# Patient Record
Sex: FEMALE | Race: OTHER | URBAN - METROPOLITAN AREA
[De-identification: names, ages, dates, MRNs, and addresses within clinical notes are randomized per-mention and may not be internally consistent; named-entity substitution may affect disease eponyms.]

---

## 2024-03-20 ENCOUNTER — EMERGENCY (EMERGENCY)
Facility: HOSPITAL | Age: 54
LOS: 0 days | Discharge: HOME | End: 2024-03-20
Attending: STUDENT IN AN ORGANIZED HEALTH CARE EDUCATION/TRAINING PROGRAM
Payer: COMMERCIAL

## 2024-03-20 VITALS
HEART RATE: 70 BPM | SYSTOLIC BLOOD PRESSURE: 100 MMHG | RESPIRATION RATE: 17 BRPM | TEMPERATURE: 98 F | DIASTOLIC BLOOD PRESSURE: 65 MMHG | OXYGEN SATURATION: 99 %

## 2024-03-20 VITALS
SYSTOLIC BLOOD PRESSURE: 96 MMHG | HEIGHT: 62 IN | OXYGEN SATURATION: 96 % | HEART RATE: 81 BPM | WEIGHT: 145.95 LBS | RESPIRATION RATE: 18 BRPM | DIASTOLIC BLOOD PRESSURE: 66 MMHG | TEMPERATURE: 99 F

## 2024-03-20 DIAGNOSIS — R11.0 NAUSEA: ICD-10-CM

## 2024-03-20 DIAGNOSIS — Z88.4 ALLERGY STATUS TO ANESTHETIC AGENT: ICD-10-CM

## 2024-03-20 DIAGNOSIS — F41.9 ANXIETY DISORDER, UNSPECIFIED: ICD-10-CM

## 2024-03-20 DIAGNOSIS — R07.89 OTHER CHEST PAIN: ICD-10-CM

## 2024-03-20 DIAGNOSIS — R10.13 EPIGASTRIC PAIN: ICD-10-CM

## 2024-03-20 DIAGNOSIS — Z91.010 ALLERGY TO PEANUTS: ICD-10-CM

## 2024-03-20 DIAGNOSIS — R14.2 ERUCTATION: ICD-10-CM

## 2024-03-20 DIAGNOSIS — E78.5 HYPERLIPIDEMIA, UNSPECIFIED: ICD-10-CM

## 2024-03-20 LAB
ALBUMIN SERPL ELPH-MCNC: 3.1 G/DL — LOW (ref 3.3–5)
ALP SERPL-CCNC: 95 U/L — SIGNIFICANT CHANGE UP (ref 40–120)
ALT FLD-CCNC: 36 U/L — SIGNIFICANT CHANGE UP (ref 12–78)
ANION GAP SERPL CALC-SCNC: 6 MMOL/L — SIGNIFICANT CHANGE UP (ref 5–17)
AST SERPL-CCNC: 31 U/L — SIGNIFICANT CHANGE UP (ref 15–37)
BASOPHILS # BLD AUTO: 0.01 K/UL — SIGNIFICANT CHANGE UP (ref 0–0.2)
BASOPHILS NFR BLD AUTO: 0.2 % — SIGNIFICANT CHANGE UP (ref 0–2)
BILIRUB SERPL-MCNC: 0.6 MG/DL — SIGNIFICANT CHANGE UP (ref 0.2–1.2)
BUN SERPL-MCNC: 12 MG/DL — SIGNIFICANT CHANGE UP (ref 7–23)
CALCIUM SERPL-MCNC: 8.3 MG/DL — LOW (ref 8.5–10.1)
CHLORIDE SERPL-SCNC: 112 MMOL/L — HIGH (ref 96–108)
CO2 SERPL-SCNC: 23 MMOL/L — SIGNIFICANT CHANGE UP (ref 22–31)
CREAT SERPL-MCNC: 0.69 MG/DL — SIGNIFICANT CHANGE UP (ref 0.5–1.3)
EGFR: 103 ML/MIN/1.73M2 — SIGNIFICANT CHANGE UP
EOSINOPHIL # BLD AUTO: 0.07 K/UL — SIGNIFICANT CHANGE UP (ref 0–0.5)
EOSINOPHIL NFR BLD AUTO: 1.2 % — SIGNIFICANT CHANGE UP (ref 0–6)
GLUCOSE SERPL-MCNC: 103 MG/DL — HIGH (ref 70–99)
HCT VFR BLD CALC: 39.7 % — SIGNIFICANT CHANGE UP (ref 34.5–45)
HGB BLD-MCNC: 13.4 G/DL — SIGNIFICANT CHANGE UP (ref 11.5–15.5)
IMM GRANULOCYTES NFR BLD AUTO: 0.3 % — SIGNIFICANT CHANGE UP (ref 0–0.9)
LIDOCAIN IGE QN: 22 U/L — SIGNIFICANT CHANGE UP (ref 13–75)
LYMPHOCYTES # BLD AUTO: 0.7 K/UL — LOW (ref 1–3.3)
LYMPHOCYTES # BLD AUTO: 11.8 % — LOW (ref 13–44)
MAGNESIUM SERPL-MCNC: 2.1 MG/DL — SIGNIFICANT CHANGE UP (ref 1.6–2.6)
MCHC RBC-ENTMCNC: 28.6 PG — SIGNIFICANT CHANGE UP (ref 27–34)
MCHC RBC-ENTMCNC: 33.8 G/DL — SIGNIFICANT CHANGE UP (ref 32–36)
MCV RBC AUTO: 84.6 FL — SIGNIFICANT CHANGE UP (ref 80–100)
MONOCYTES # BLD AUTO: 0.44 K/UL — SIGNIFICANT CHANGE UP (ref 0–0.9)
MONOCYTES NFR BLD AUTO: 7.4 % — SIGNIFICANT CHANGE UP (ref 2–14)
NEUTROPHILS # BLD AUTO: 4.69 K/UL — SIGNIFICANT CHANGE UP (ref 1.8–7.4)
NEUTROPHILS NFR BLD AUTO: 79.1 % — HIGH (ref 43–77)
NRBC # BLD: 0 /100 WBCS — SIGNIFICANT CHANGE UP (ref 0–0)
PLATELET # BLD AUTO: 274 K/UL — SIGNIFICANT CHANGE UP (ref 150–400)
POTASSIUM SERPL-MCNC: 3.8 MMOL/L — SIGNIFICANT CHANGE UP (ref 3.5–5.3)
POTASSIUM SERPL-SCNC: 3.8 MMOL/L — SIGNIFICANT CHANGE UP (ref 3.5–5.3)
PROT SERPL-MCNC: 6.8 GM/DL — SIGNIFICANT CHANGE UP (ref 6–8.3)
RBC # BLD: 4.69 M/UL — SIGNIFICANT CHANGE UP (ref 3.8–5.2)
RBC # FLD: 14.7 % — HIGH (ref 10.3–14.5)
SODIUM SERPL-SCNC: 141 MMOL/L — SIGNIFICANT CHANGE UP (ref 135–145)
TROPONIN I, HIGH SENSITIVITY RESULT: 40 NG/L — SIGNIFICANT CHANGE UP
TROPONIN I, HIGH SENSITIVITY RESULT: 40.3 NG/L — SIGNIFICANT CHANGE UP
WBC # BLD: 5.93 K/UL — SIGNIFICANT CHANGE UP (ref 3.8–10.5)
WBC # FLD AUTO: 5.93 K/UL — SIGNIFICANT CHANGE UP (ref 3.8–10.5)

## 2024-03-20 RX ORDER — SUCRALFATE 1 G
1 TABLET ORAL ONCE
Refills: 0 | Status: COMPLETED | OUTPATIENT
Start: 2024-03-20 | End: 2024-03-20

## 2024-03-20 RX ORDER — FAMOTIDINE 10 MG/ML
20 INJECTION INTRAVENOUS ONCE
Refills: 0 | Status: COMPLETED | OUTPATIENT
Start: 2024-03-20 | End: 2024-03-20

## 2024-03-20 RX ORDER — ONDANSETRON 8 MG/1
4 TABLET, FILM COATED ORAL ONCE
Refills: 0 | Status: COMPLETED | OUTPATIENT
Start: 2024-03-20 | End: 2024-03-20

## 2024-03-20 RX ADMIN — ONDANSETRON 4 MILLIGRAM(S): 8 TABLET, FILM COATED ORAL at 19:42

## 2024-03-20 RX ADMIN — Medication 1 GRAM(S): at 22:35

## 2024-03-20 RX ADMIN — Medication 30 MILLILITER(S): at 20:11

## 2024-03-20 RX ADMIN — FAMOTIDINE 20 MILLIGRAM(S): 10 INJECTION INTRAVENOUS at 19:42

## 2024-03-20 NOTE — ED PROVIDER NOTE - CARE PROVIDER_API CALL
Low Mohamud  Gastroenterology  [address] [place] [address], [address]  [place] [address]  Phone: [phone]  Fax: [phone]  Follow Up Time: 7-10 Days    Sandeep Vinson  Cardiovascular Disease  [address] [place] [address]  Phone: [phone]  Fax: [phone]  Follow Up Time: 7-10 Days

## 2024-03-20 NOTE — ED ADULT NURSE NOTE - CINV DISCH TEACH PARTICIP
Patient/Family 1. Please follow up with your PMD in the next 48-72 hours.    2. Your symptoms will likely persist for the next few days/week. For pain, take Tylenol 975 mg (3 regular strength tablets) or 1000 mg (2 Extra strength tables) every 6 hours. Take Motrin 600 mg every 8 hours with food for additional relief. Additionally you may apply intermittent ice/heat to any of the areas of pain as tolerated for additional symptom relief.    3.Recommend over-the-counter Salonpas (Lidoderm) patches as needed for additional symptoms relief. Please follow instructions on packaging for proper usage.     4. Please return to the Emergency Department immediately if you develop any new/worsening symptoms, including headache not relieved with over-the-counter pain medication (tylenol/motrin), visual disturbances, fever/chills, weakness, numbness/tingling, or chest pain.

## 2024-03-20 NOTE — ED ADULT NURSE NOTE - NS ED NURSE LEVEL OF CONSCIOUSNESS MENTAL STATUS
This office note has been dictated.    More than half of the visit was spent in counseling with total visit time of 18 min., and counseling time of more than  12 min.   Awake

## 2024-03-20 NOTE — ED ADULT TRIAGE NOTE - CHIEF COMPLAINT QUOTE
pt c/o upper abdominal, chest pain, nausea, sweating started last night. chest pain became worse today.  pt also took azithromycin today for abdominal pain and sore throat. denies vomiting. 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 denies pmh.

## 2024-03-20 NOTE — ED PROVIDER NOTE - CARE PROVIDERS DIRECT ADDRESSES
[email or web address]

## 2024-03-20 NOTE — ED PROVIDER NOTE - OBJECTIVE STATEMENT
54F PMH HLD pw CP. Pt reports w/ episode nausea, anxiety at 4P yesterday a/w SOB while thinking about starting new job, pt states today w/ epigastric pain w/ radiation into chest, describes as burning, a/w belching. Pt started Z-pack yesterday for ST, pt states p/u'd script OTC. Pt took medication on empty stomach today, after which onset epigastric pain / burning. Pt w/o current CP or SOB. Denies F/C, h/a, dizziness, palpitations, cough, vomiting, diarrhea, constipation, UTI sx, LE pain / swelling. No medications attempted PTA for symptom relief.     PMH as above, PSH none, NKDA, Meds none. No FH CAD.

## 2024-03-20 NOTE — ED PROVIDER NOTE - PATIENT PORTAL LINK FT
You can access the FollowMyHealth Patient Portal offered by [facility] by registering at the following website: [email or web address]. By joining [employer]’s FollowMyHealth portal, you will also be able to view your health information using other applications (apps) compatible with our system.

## 2024-03-20 NOTE — ED ADULT NURSE NOTE - OBJECTIVE STATEMENT
pt AOx4, responsive, ambulatory, daughter by bedside. Pt c/o midsternal chest pressure, nausea, sour taste in her mouth, chills, and weakness since 4pm today. states diarrhea x 2 episodes and bilateral upper abdominal pain last night; denies blood in diarrhea. denies SOB/difficulty breathing, fever,  symptoms. pt also took azithromycin starting today for sore throat ongoing 1 week. nkda. pmh hld. on cardiac monitor, sinus rthymn, tolerating RA on 98% pulse ox, RR 19 even, /66.

## 2024-03-20 NOTE — ED PROVIDER NOTE - CLINICAL SUMMARY MEDICAL DECISION MAKING FREE TEXT BOX
54F PMH HLD pw epigastric burning w/ radiation into chest onset this afternoon s/p taking medication on empty stomach. Afebrile, VSS. Well appearing, in NAD. Exam as noted in PE. Plan for CBC, CMP, lipase, trop, CXR. Give Pepcid, Maalox, Zofran. Re-eval. 54F PMH HLD pw epigastric burning w/ radiation into chest onset this afternoon s/p taking medication on empty stomach. Afebrile, VSS. Well appearing, in NAD. Exam as noted in PE. Plan for CBC, CMP, lipase, trop, CXR. Give Pepcid, Maalox, Zofran. Re-eval.  W/u w/o significant abnormalities: trop negative x2 sets. On re-eval, resting comfortably. Pt endorses resolution of symptoms s/p ED medications. Stable for d/c home. Recommend continued OTC antiacids PRN symptomatic relief. Given / recommend close outpatient GI, Cardio and PCP f/u. Return signs / symptoms d/w pt, daughter. They understand / agree w/ this plan.

## 2024-03-20 NOTE — ED PROVIDER NOTE - PHYSICAL EXAMINATION
GEN: Awake, alert, interactive, NAD.  HEAD AND NECK: NC/AT. Airway patent. Neck supple.   EYES:  Clear b/l. EOMI. PERRL.   ENT: Moist mucus membranes.   CARDIAC: Regular rate, regular rhythm. No evident pedal edema.    RESP/CHEST: Normal respiratory effort with no use of accessory muscles or retractions. Clear throughout on auscultation.  ABD: Soft, non-distended, + mild epigastric TTP. No rebound, no guarding.   BACK: No midline spinal TTP. No CVAT.   EXTREMITIES: Moving all extremities with no apparent deformities.   SKIN: Warm, dry, intact normal color. No rash.   NEURO: AOx3, CN II-XII grossly intact, no focal deficits.   PSYCH: Appropriate mood and affect.

## 2024-03-20 NOTE — ED PROVIDER NOTE - PROVIDER TOKENS
PROVIDER:[TOKEN:[[id number]],FOLLOWUP:[7-10 Days]],PROVIDER:[TOKEN:[[id number]],FOLLOWUP:[7-10 Days]]

## 2024-07-06 ENCOUNTER — EMERGENCY (EMERGENCY)
Facility: HOSPITAL | Age: 54
LOS: 1 days | Discharge: HOME | End: 2024-07-06
Attending: EMERGENCY MEDICINE
Payer: COMMERCIAL

## 2024-07-06 VITALS
RESPIRATION RATE: 18 BRPM | SYSTOLIC BLOOD PRESSURE: 121 MMHG | OXYGEN SATURATION: 99 % | TEMPERATURE: 98 F | DIASTOLIC BLOOD PRESSURE: 78 MMHG | HEART RATE: 62 BPM

## 2024-07-06 VITALS
HEART RATE: 75 BPM | DIASTOLIC BLOOD PRESSURE: 87 MMHG | RESPIRATION RATE: 18 BRPM | TEMPERATURE: 98 F | SYSTOLIC BLOOD PRESSURE: 126 MMHG | WEIGHT: 147.93 LBS | OXYGEN SATURATION: 100 % | HEIGHT: 62 IN

## 2024-07-06 DIAGNOSIS — Z90.49 ACQUIRED ABSENCE OF OTHER SPECIFIED PARTS OF DIGESTIVE TRACT: Chronic | ICD-10-CM

## 2024-07-06 LAB
ALBUMIN SERPL ELPH-MCNC: 3.5 G/DL — SIGNIFICANT CHANGE UP (ref 3.5–5)
ALP SERPL-CCNC: 85 U/L — SIGNIFICANT CHANGE UP (ref 40–120)
ALT FLD-CCNC: 40 U/L DA — SIGNIFICANT CHANGE UP (ref 10–60)
ANION GAP SERPL CALC-SCNC: 5 MMOL/L — SIGNIFICANT CHANGE UP (ref 5–17)
APPEARANCE UR: CLEAR — SIGNIFICANT CHANGE UP
AST SERPL-CCNC: 27 U/L — SIGNIFICANT CHANGE UP (ref 10–40)
BASOPHILS # BLD AUTO: 0.02 K/UL — SIGNIFICANT CHANGE UP (ref 0–0.2)
BASOPHILS NFR BLD AUTO: 0.7 % — SIGNIFICANT CHANGE UP (ref 0–2)
BILIRUB SERPL-MCNC: 0.3 MG/DL — SIGNIFICANT CHANGE UP (ref 0.2–1.2)
BILIRUB UR-MCNC: NEGATIVE — SIGNIFICANT CHANGE UP
BUN SERPL-MCNC: 10 MG/DL — SIGNIFICANT CHANGE UP (ref 7–18)
CALCIUM SERPL-MCNC: 8.7 MG/DL — SIGNIFICANT CHANGE UP (ref 8.4–10.5)
CHLORIDE SERPL-SCNC: 112 MMOL/L — HIGH (ref 96–108)
CO2 SERPL-SCNC: 24 MMOL/L — SIGNIFICANT CHANGE UP (ref 22–31)
COLOR SPEC: YELLOW — SIGNIFICANT CHANGE UP
CREAT SERPL-MCNC: 0.59 MG/DL — SIGNIFICANT CHANGE UP (ref 0.5–1.3)
DIFF PNL FLD: NEGATIVE — SIGNIFICANT CHANGE UP
EGFR: 107 ML/MIN/1.73M2 — SIGNIFICANT CHANGE UP
EOSINOPHIL # BLD AUTO: 0.05 K/UL — SIGNIFICANT CHANGE UP (ref 0–0.5)
EOSINOPHIL NFR BLD AUTO: 1.8 % — SIGNIFICANT CHANGE UP (ref 0–6)
GLUCOSE SERPL-MCNC: 106 MG/DL — HIGH (ref 70–99)
GLUCOSE UR QL: NEGATIVE MG/DL — SIGNIFICANT CHANGE UP
HCG UR QL: NEGATIVE — SIGNIFICANT CHANGE UP
HCT VFR BLD CALC: 41.3 % — SIGNIFICANT CHANGE UP (ref 34.5–45)
HGB BLD-MCNC: 13.9 G/DL — SIGNIFICANT CHANGE UP (ref 11.5–15.5)
IMM GRANULOCYTES NFR BLD AUTO: 0 % — SIGNIFICANT CHANGE UP (ref 0–0.9)
KETONES UR-MCNC: NEGATIVE MG/DL — SIGNIFICANT CHANGE UP
LACTATE SERPL-SCNC: 1.3 MMOL/L — SIGNIFICANT CHANGE UP (ref 0.7–2)
LEUKOCYTE ESTERASE UR-ACNC: NEGATIVE — SIGNIFICANT CHANGE UP
LIDOCAIN IGE QN: 31 U/L — SIGNIFICANT CHANGE UP (ref 13–75)
LYMPHOCYTES # BLD AUTO: 1.22 K/UL — SIGNIFICANT CHANGE UP (ref 1–3.3)
LYMPHOCYTES # BLD AUTO: 43.7 % — SIGNIFICANT CHANGE UP (ref 13–44)
MAGNESIUM SERPL-MCNC: 2.2 MG/DL — SIGNIFICANT CHANGE UP (ref 1.6–2.6)
MCHC RBC-ENTMCNC: 28.7 PG — SIGNIFICANT CHANGE UP (ref 27–34)
MCHC RBC-ENTMCNC: 33.7 GM/DL — SIGNIFICANT CHANGE UP (ref 32–36)
MCV RBC AUTO: 85.3 FL — SIGNIFICANT CHANGE UP (ref 80–100)
MONOCYTES # BLD AUTO: 0.23 K/UL — SIGNIFICANT CHANGE UP (ref 0–0.9)
MONOCYTES NFR BLD AUTO: 8.2 % — SIGNIFICANT CHANGE UP (ref 2–14)
NEUTROPHILS # BLD AUTO: 1.27 K/UL — LOW (ref 1.8–7.4)
NEUTROPHILS NFR BLD AUTO: 45.6 % — SIGNIFICANT CHANGE UP (ref 43–77)
NITRITE UR-MCNC: NEGATIVE — SIGNIFICANT CHANGE UP
NRBC # BLD: 0 /100 WBCS — SIGNIFICANT CHANGE UP (ref 0–0)
PH UR: 8.5 (ref 5–8)
PHOSPHATE SERPL-MCNC: 3.3 MG/DL — SIGNIFICANT CHANGE UP (ref 2.5–4.5)
PLATELET # BLD AUTO: 298 K/UL — SIGNIFICANT CHANGE UP (ref 150–400)
POTASSIUM SERPL-MCNC: 4.1 MMOL/L — SIGNIFICANT CHANGE UP (ref 3.5–5.3)
POTASSIUM SERPL-SCNC: 4.1 MMOL/L — SIGNIFICANT CHANGE UP (ref 3.5–5.3)
PROT SERPL-MCNC: 7.2 G/DL — SIGNIFICANT CHANGE UP (ref 6–8.3)
PROT UR-MCNC: NEGATIVE MG/DL — SIGNIFICANT CHANGE UP
RBC # BLD: 4.84 M/UL — SIGNIFICANT CHANGE UP (ref 3.8–5.2)
RBC # FLD: 15 % — HIGH (ref 10.3–14.5)
SODIUM SERPL-SCNC: 141 MMOL/L — SIGNIFICANT CHANGE UP (ref 135–145)
SP GR SPEC: 1.02 — SIGNIFICANT CHANGE UP (ref 1–1.03)
TROPONIN I, HIGH SENSITIVITY RESULT: 50.2 NG/L — SIGNIFICANT CHANGE UP
TROPONIN I, HIGH SENSITIVITY RESULT: 56.7 NG/L — HIGH
UROBILINOGEN FLD QL: 0.2 MG/DL — SIGNIFICANT CHANGE UP (ref 0.2–1)
WBC # BLD: 2.79 K/UL — LOW (ref 3.8–10.5)
WBC # FLD AUTO: 2.79 K/UL — LOW (ref 3.8–10.5)

## 2024-07-06 PROCEDURE — 93005 ELECTROCARDIOGRAM TRACING: CPT

## 2024-07-06 PROCEDURE — 99285 EMERGENCY DEPT VISIT HI MDM: CPT

## 2024-07-06 PROCEDURE — 84100 ASSAY OF PHOSPHORUS: CPT

## 2024-07-06 PROCEDURE — 87086 URINE CULTURE/COLONY COUNT: CPT

## 2024-07-06 PROCEDURE — 81003 URINALYSIS AUTO W/O SCOPE: CPT

## 2024-07-06 PROCEDURE — 83690 ASSAY OF LIPASE: CPT

## 2024-07-06 PROCEDURE — 76775 US EXAM ABDO BACK WALL LIM: CPT | Mod: 26

## 2024-07-06 PROCEDURE — 85025 COMPLETE CBC W/AUTO DIFF WBC: CPT

## 2024-07-06 PROCEDURE — 83605 ASSAY OF LACTIC ACID: CPT

## 2024-07-06 PROCEDURE — 81025 URINE PREGNANCY TEST: CPT

## 2024-07-06 PROCEDURE — 96374 THER/PROPH/DIAG INJ IV PUSH: CPT

## 2024-07-06 PROCEDURE — 84484 ASSAY OF TROPONIN QUANT: CPT

## 2024-07-06 PROCEDURE — 83735 ASSAY OF MAGNESIUM: CPT

## 2024-07-06 PROCEDURE — 71046 X-RAY EXAM CHEST 2 VIEWS: CPT

## 2024-07-06 PROCEDURE — 76775 US EXAM ABDO BACK WALL LIM: CPT

## 2024-07-06 PROCEDURE — 71046 X-RAY EXAM CHEST 2 VIEWS: CPT | Mod: 26

## 2024-07-06 PROCEDURE — 80053 COMPREHEN METABOLIC PANEL: CPT

## 2024-07-06 PROCEDURE — 36415 COLL VENOUS BLD VENIPUNCTURE: CPT

## 2024-07-06 PROCEDURE — 99285 EMERGENCY DEPT VISIT HI MDM: CPT | Mod: 25

## 2024-07-06 RX ORDER — LIDOCAINE HCL 28 MG/G
1 GEL TOPICAL
Qty: 20 | Refills: 0
Start: 2024-07-06 | End: 2024-07-10

## 2024-07-06 RX ORDER — KETOROLAC TROMETHAMINE 30 MG/ML
15 INJECTION, SOLUTION INTRAMUSCULAR ONCE
Refills: 0 | Status: DISCONTINUED | OUTPATIENT
Start: 2024-07-06 | End: 2024-07-06

## 2024-07-06 RX ORDER — KETOROLAC TROMETHAMINE 30 MG/ML
1 INJECTION, SOLUTION INTRAMUSCULAR
Qty: 15 | Refills: 0
Start: 2024-07-06 | End: 2024-07-10

## 2024-07-06 RX ORDER — LIDOCAINE HCL 28 MG/G
1 GEL TOPICAL ONCE
Refills: 0 | Status: COMPLETED | OUTPATIENT
Start: 2024-07-06 | End: 2024-07-06

## 2024-07-06 RX ADMIN — KETOROLAC TROMETHAMINE 15 MILLIGRAM(S): 30 INJECTION, SOLUTION INTRAMUSCULAR at 09:49

## 2024-07-06 RX ADMIN — Medication 10 MILLIGRAM(S): at 09:49

## 2024-07-06 RX ADMIN — LIDOCAINE HCL 1 PATCH: 28 GEL TOPICAL at 09:50

## 2024-07-06 RX ADMIN — KETOROLAC TROMETHAMINE 15 MILLIGRAM(S): 30 INJECTION, SOLUTION INTRAMUSCULAR at 10:19

## 2024-07-07 LAB
CULTURE RESULTS: SIGNIFICANT CHANGE UP
SPECIMEN SOURCE: SIGNIFICANT CHANGE UP

## 2024-10-22 ENCOUNTER — EMERGENCY (EMERGENCY)
Facility: HOSPITAL | Age: 54
LOS: 1 days | Discharge: HOME | End: 2024-10-22
Attending: EMERGENCY MEDICINE
Payer: SELF-PAY

## 2024-10-22 VITALS
HEIGHT: 62 IN | TEMPERATURE: 98 F | OXYGEN SATURATION: 98 % | HEART RATE: 78 BPM | DIASTOLIC BLOOD PRESSURE: 77 MMHG | WEIGHT: 145.51 LBS | SYSTOLIC BLOOD PRESSURE: 131 MMHG | RESPIRATION RATE: 18 BRPM

## 2024-10-22 DIAGNOSIS — Z90.49 ACQUIRED ABSENCE OF OTHER SPECIFIED PARTS OF DIGESTIVE TRACT: Chronic | ICD-10-CM

## 2024-10-22 PROCEDURE — 73110 X-RAY EXAM OF WRIST: CPT | Mod: 26,RT

## 2024-10-22 PROCEDURE — 73110 X-RAY EXAM OF WRIST: CPT

## 2024-10-22 PROCEDURE — 99284 EMERGENCY DEPT VISIT MOD MDM: CPT | Mod: 25

## 2024-10-22 PROCEDURE — 99284 EMERGENCY DEPT VISIT MOD MDM: CPT

## 2024-10-22 PROCEDURE — 73120 X-RAY EXAM OF HAND: CPT | Mod: 26,RT

## 2024-10-22 PROCEDURE — 73120 X-RAY EXAM OF HAND: CPT

## 2024-10-22 RX ORDER — ACETAMINOPHEN 325 MG
650 TABLET ORAL ONCE
Refills: 0 | Status: COMPLETED | OUTPATIENT
Start: 2024-10-22 | End: 2024-10-22

## 2024-10-22 RX ADMIN — Medication 650 MILLIGRAM(S): at 19:28

## 2024-10-22 RX ADMIN — Medication 650 MILLIGRAM(S): at 19:58

## 2024-10-22 NOTE — ED PROVIDER NOTE - OBJECTIVE STATEMENT
54-year-old female presents ED with right hand pain after closing door on hand earlier this morning.  As per patient symptoms happened around 9 AM this morning.  Bruising and pain continued which is why patient came in for evaluation.  Patient denies any use of blood thinners.

## 2024-10-22 NOTE — ED ADULT NURSE NOTE - CHIEF COMPLAINT QUOTE
as per pt I slammed my right hand  by the door at work ,with right hand bruise and pain,and  last sunday I had a shingles vaccine 2nd dose  with redness around the injection site (right  )started yesterday

## 2024-10-22 NOTE — ED PROVIDER NOTE - CLINICAL SUMMARY MEDICAL DECISION MAKING FREE TEXT BOX
54-year-old female presents with right hand pain after a door slammed on hand.  Patient with bruising over the third fourth fifth metacarpal bones.  Concern for possible fracture.  X-ray, analgesia, reassess Initial (On Arrival)

## 2024-10-22 NOTE — ED PROVIDER NOTE - CARDIAC, MLM
Normal rate, regular rhythm.  Heart sounds S1, S2.  No murmurs, rubs or gallops.
DISPLAY PLAN FREE TEXT

## 2024-10-22 NOTE — ED PROVIDER NOTE - NSFOLLOWUPINSTRUCTIONS_ED_ALL_ED_FT
Hand Contusion  A hand contusion is a deep bruise to the hand. Contusions are the result of a blunt injury to tissues and muscle fibers under the skin. The injury causes bleeding under the skin. The skin overlying the contusion may turn blue, purple, or yellow. Minor injuries may cause a painless contusion, but more severe injuries may cause contusions that stay painful and swollen for a few weeks.    What are the causes?  This condition is usually caused by a hard hit or direct force to your hand, such as having a heavy object fall on your hand.    What are the signs or symptoms?  Symptoms of this condition include:  A swollen hand.  Pain and tenderness in your hand.  Discoloration of your hand. The area may have redness and then turn blue, purple, or yellow.  How is this diagnosed?  This condition is diagnosed based on:  A physical exam.  Your medical history.  Imaging studies, such as:  An X-ray. This may be needed to check for other injuries, such as broken bones (fractures).  A CT scan or an MRI. This may be done if your health care provider thinks you have torn or injured ligaments.  How is this treated?  This condition may be treated with:  Rest, ice, pressure (compression), and raising (elevating) the injured area. This is often called RICE therapy.  An elastic wrap to support your hand.  Over-the-counter medicines to control pain.  Follow these instructions at home:  RICE therapy    A bag of ice on a towel on the skin.  Rest the injured area.  If directed, put ice on the injured area.  Put ice in a plastic bag.  Place a towel between your skin and the bag.  Leave the ice on for 20 minutes, 2–3 times a day.  If directed, apply light compression to the injured area using an elastic wrap.  Make sure the wrap is not too tight.  If your fingers become numb or turn cold or blue, take the wrap off and reapply it more loosely.  Remove and reapply the wrap as told by your health care provider.  Raise (elevate) the injured area above the level of your heart while you are sitting or lying down.  General instructions    Take over-the-counter and prescription medicines only as told by your health care provider.  Protect your hand from getting injured further.  Keep all follow-up visits as told by your health care provider. This is important.  Contact a health care provider if:  Your symptoms do not improve after several days of treatment.  You have increased redness, swelling, or pain in your hand or fingers.  You have difficulty moving the injured area.  Your swelling or pain is not relieved with medicines.  Get help right away if:  You have severe pain.  Your hand or fingers become numb.  Your hand or fingers turn pale, blue, or cold.  You cannot move your hand or wrist.  Your hand is warm to the touch.  Summary  A hand contusion is a deep bruise to the hand.  Contusions are the result of a blunt injury to tissues and muscle fibers under the skin.  This injury is treated with rest, ice, compression and elevation.  This information is not intended to replace advice given to you by your health care provider. Make sure you discuss any questions you have with your health care provider.    Document Revised: 11/20/2023 Document Reviewed: 11/20/2023  Elsevier Patient Education © 2024 [employer] Inc[employer]

## 2024-10-22 NOTE — ED ADULT TRIAGE NOTE - CHIEF COMPLAINT QUOTE
as per pt I slammed my right hand  by the door at work ,with right hand bruise and pain,and  last sunday I had a shingles vaccine 2nd dose  with redness around the injection site started yesterday as per pt I slammed my right hand  by the door at work ,with right hand bruise and pain,and  last sunday I had a shingles vaccine 2nd dose  with redness around the injection site (right  )started yesterday

## 2024-10-22 NOTE — ED PROVIDER NOTE - PHYSICAL EXAMINATION
Extensive bruising to  dorsum of right hand most pronounced over the third and fourth metacarpal bones

## 2024-10-22 NOTE — ED ADULT NURSE NOTE - OBJECTIVE STATEMENT
AOX4 +ambulatory patient reports door slammed on her hand x this morning. +bruising +pulses no deformity

## 2024-10-23 PROBLEM — Z78.9 OTHER SPECIFIED HEALTH STATUS: Chronic | Status: ACTIVE | Noted: 2024-07-06

## 2025-01-27 ENCOUNTER — INPATIENT (INPATIENT)
Facility: HOSPITAL | Age: 55
LOS: 0 days | Discharge: HOME | DRG: 552 | End: 2025-01-28
Attending: STUDENT IN AN ORGANIZED HEALTH CARE EDUCATION/TRAINING PROGRAM | Admitting: STUDENT IN AN ORGANIZED HEALTH CARE EDUCATION/TRAINING PROGRAM
Payer: COMMERCIAL

## 2025-01-27 VITALS
WEIGHT: 145.95 LBS | HEART RATE: 66 BPM | OXYGEN SATURATION: 97 % | SYSTOLIC BLOOD PRESSURE: 119 MMHG | DIASTOLIC BLOOD PRESSURE: 69 MMHG | TEMPERATURE: 98 F | HEIGHT: 64 IN | RESPIRATION RATE: 17 BRPM

## 2025-01-27 DIAGNOSIS — E78.5 HYPERLIPIDEMIA, UNSPECIFIED: ICD-10-CM

## 2025-01-27 DIAGNOSIS — Z90.49 ACQUIRED ABSENCE OF OTHER SPECIFIED PARTS OF DIGESTIVE TRACT: Chronic | ICD-10-CM

## 2025-01-27 DIAGNOSIS — K58.9 IRRITABLE BOWEL SYNDROME, UNSPECIFIED: ICD-10-CM

## 2025-01-27 DIAGNOSIS — Z98.890 OTHER SPECIFIED POSTPROCEDURAL STATES: Chronic | ICD-10-CM

## 2025-01-27 DIAGNOSIS — Z29.9 ENCOUNTER FOR PROPHYLACTIC MEASURES, UNSPECIFIED: ICD-10-CM

## 2025-01-27 LAB
ALBUMIN SERPL ELPH-MCNC: 3.5 G/DL — SIGNIFICANT CHANGE UP (ref 3.5–5)
ALP SERPL-CCNC: 94 U/L — SIGNIFICANT CHANGE UP (ref 40–120)
ALT FLD-CCNC: 27 U/L DA — SIGNIFICANT CHANGE UP (ref 10–60)
ANION GAP SERPL CALC-SCNC: 6 MMOL/L — SIGNIFICANT CHANGE UP (ref 5–17)
APPEARANCE UR: CLEAR — SIGNIFICANT CHANGE UP
AST SERPL-CCNC: 16 U/L — SIGNIFICANT CHANGE UP (ref 10–40)
BACTERIA # UR AUTO: NEGATIVE /HPF — SIGNIFICANT CHANGE UP
BASOPHILS # BLD AUTO: 0.02 K/UL — SIGNIFICANT CHANGE UP (ref 0–0.2)
BASOPHILS NFR BLD AUTO: 0.5 % — SIGNIFICANT CHANGE UP (ref 0–2)
BILIRUB SERPL-MCNC: 0.3 MG/DL — SIGNIFICANT CHANGE UP (ref 0.2–1.2)
BILIRUB UR-MCNC: NEGATIVE — SIGNIFICANT CHANGE UP
BUN SERPL-MCNC: 12 MG/DL — SIGNIFICANT CHANGE UP (ref 7–18)
CALCIUM SERPL-MCNC: 9 MG/DL — SIGNIFICANT CHANGE UP (ref 8.4–10.5)
CHLORIDE SERPL-SCNC: 110 MMOL/L — HIGH (ref 96–108)
CO2 SERPL-SCNC: 27 MMOL/L — SIGNIFICANT CHANGE UP (ref 22–31)
COLOR SPEC: YELLOW — SIGNIFICANT CHANGE UP
COMMENT - URINE: SIGNIFICANT CHANGE UP
CREAT SERPL-MCNC: 0.62 MG/DL — SIGNIFICANT CHANGE UP (ref 0.5–1.3)
DIFF PNL FLD: NEGATIVE — SIGNIFICANT CHANGE UP
EGFR: 106 ML/MIN/1.73M2 — SIGNIFICANT CHANGE UP
EOSINOPHIL # BLD AUTO: 0.04 K/UL — SIGNIFICANT CHANGE UP (ref 0–0.5)
EOSINOPHIL NFR BLD AUTO: 1 % — SIGNIFICANT CHANGE UP (ref 0–6)
EPI CELLS # UR: PRESENT
GLUCOSE SERPL-MCNC: 104 MG/DL — HIGH (ref 70–99)
GLUCOSE UR QL: NEGATIVE MG/DL — SIGNIFICANT CHANGE UP
HCT VFR BLD CALC: 42.7 % — SIGNIFICANT CHANGE UP (ref 34.5–45)
HGB BLD-MCNC: 14.1 G/DL — SIGNIFICANT CHANGE UP (ref 11.5–15.5)
IMM GRANULOCYTES NFR BLD AUTO: 0.2 % — SIGNIFICANT CHANGE UP (ref 0–0.9)
KETONES UR-MCNC: NEGATIVE MG/DL — SIGNIFICANT CHANGE UP
LEUKOCYTE ESTERASE UR-ACNC: ABNORMAL
LYMPHOCYTES # BLD AUTO: 1.3 K/UL — SIGNIFICANT CHANGE UP (ref 1–3.3)
LYMPHOCYTES # BLD AUTO: 32.2 % — SIGNIFICANT CHANGE UP (ref 13–44)
MCHC RBC-ENTMCNC: 28.7 PG — SIGNIFICANT CHANGE UP (ref 27–34)
MCHC RBC-ENTMCNC: 33 G/DL — SIGNIFICANT CHANGE UP (ref 32–36)
MCV RBC AUTO: 86.8 FL — SIGNIFICANT CHANGE UP (ref 80–100)
MONOCYTES # BLD AUTO: 0.3 K/UL — SIGNIFICANT CHANGE UP (ref 0–0.9)
MONOCYTES NFR BLD AUTO: 7.4 % — SIGNIFICANT CHANGE UP (ref 2–14)
NEUTROPHILS # BLD AUTO: 2.37 K/UL — SIGNIFICANT CHANGE UP (ref 1.8–7.4)
NEUTROPHILS NFR BLD AUTO: 58.7 % — SIGNIFICANT CHANGE UP (ref 43–77)
NITRITE UR-MCNC: NEGATIVE — SIGNIFICANT CHANGE UP
NRBC # BLD: 0 /100 WBCS — SIGNIFICANT CHANGE UP (ref 0–0)
NRBC BLD-RTO: 0 /100 WBCS — SIGNIFICANT CHANGE UP (ref 0–0)
PH UR: 6 — SIGNIFICANT CHANGE UP (ref 5–8)
PLATELET # BLD AUTO: 289 K/UL — SIGNIFICANT CHANGE UP (ref 150–400)
POTASSIUM SERPL-MCNC: 4.3 MMOL/L — SIGNIFICANT CHANGE UP (ref 3.5–5.3)
POTASSIUM SERPL-SCNC: 4.3 MMOL/L — SIGNIFICANT CHANGE UP (ref 3.5–5.3)
PROT SERPL-MCNC: 6.9 G/DL — SIGNIFICANT CHANGE UP (ref 6–8.3)
PROT UR-MCNC: NEGATIVE MG/DL — SIGNIFICANT CHANGE UP
RBC # BLD: 4.92 M/UL — SIGNIFICANT CHANGE UP (ref 3.8–5.2)
RBC # FLD: 14.4 % — SIGNIFICANT CHANGE UP (ref 10.3–14.5)
RBC CASTS # UR COMP ASSIST: 0 /HPF — SIGNIFICANT CHANGE UP (ref 0–4)
SODIUM SERPL-SCNC: 143 MMOL/L — SIGNIFICANT CHANGE UP (ref 135–145)
SP GR SPEC: 1.01 — SIGNIFICANT CHANGE UP (ref 1–1.03)
UROBILINOGEN FLD QL: 0.2 MG/DL — SIGNIFICANT CHANGE UP (ref 0.2–1)
WBC # BLD: 4.04 K/UL — SIGNIFICANT CHANGE UP (ref 3.8–10.5)
WBC # FLD AUTO: 4.04 K/UL — SIGNIFICANT CHANGE UP (ref 3.8–10.5)
WBC UR QL: 1 /HPF — SIGNIFICANT CHANGE UP (ref 0–5)

## 2025-01-27 PROCEDURE — 99222 1ST HOSP IP/OBS MODERATE 55: CPT | Mod: GC

## 2025-01-27 PROCEDURE — 74177 CT ABD & PELVIS W/CONTRAST: CPT | Mod: 26

## 2025-01-27 PROCEDURE — 99285 EMERGENCY DEPT VISIT HI MDM: CPT

## 2025-01-27 RX ORDER — DIAZEPAM 5 MG
5 TABLET ORAL ONCE
Refills: 0 | Status: DISCONTINUED | OUTPATIENT
Start: 2025-01-27 | End: 2025-01-27

## 2025-01-27 RX ORDER — KETOROLAC TROMETHAMINE 10 MG
15 TABLET ORAL ONCE
Refills: 0 | Status: DISCONTINUED | OUTPATIENT
Start: 2025-01-27 | End: 2025-01-27

## 2025-01-27 RX ORDER — ACETAMINOPHEN 160 MG/5ML
650 SUSPENSION ORAL EVERY 6 HOURS
Refills: 0 | Status: DISCONTINUED | OUTPATIENT
Start: 2025-01-27 | End: 2025-01-28

## 2025-01-27 RX ORDER — SENNOSIDES 8.6 MG
2 TABLET ORAL AT BEDTIME
Refills: 0 | Status: DISCONTINUED | OUTPATIENT
Start: 2025-01-27 | End: 2025-01-28

## 2025-01-27 RX ORDER — BACTERIOSTATIC SODIUM CHLORIDE 0.9 %
1000 VIAL (ML) INJECTION ONCE
Refills: 0 | Status: COMPLETED | OUTPATIENT
Start: 2025-01-27 | End: 2025-01-27

## 2025-01-27 RX ORDER — DICYCLOMINE HCL 20 MG
10 TABLET ORAL DAILY
Refills: 0 | Status: DISCONTINUED | OUTPATIENT
Start: 2025-01-27 | End: 2025-01-28

## 2025-01-27 RX ORDER — LIDOCAINE HYDROCHLORIDE 30 MG/G
1 CREAM TOPICAL DAILY
Refills: 0 | Status: DISCONTINUED | OUTPATIENT
Start: 2025-01-27 | End: 2025-01-28

## 2025-01-27 RX ORDER — ENOXAPARIN SODIUM 100 MG/ML
40 INJECTION SUBCUTANEOUS EVERY 24 HOURS
Refills: 0 | Status: DISCONTINUED | OUTPATIENT
Start: 2025-01-27 | End: 2025-01-28

## 2025-01-27 RX ORDER — DICYCLOMINE HCL 20 MG
2 TABLET ORAL
Refills: 0 | DISCHARGE

## 2025-01-27 RX ORDER — LINACLOTIDE 290 UG/1
1 CAPSULE, GELATIN COATED ORAL
Refills: 0 | DISCHARGE

## 2025-01-27 RX ORDER — KETOROLAC TROMETHAMINE 10 MG
30 TABLET ORAL EVERY 8 HOURS
Refills: 0 | Status: DISCONTINUED | OUTPATIENT
Start: 2025-01-27 | End: 2025-01-28

## 2025-01-27 RX ORDER — SENNOSIDES 8.6 MG
2 TABLET ORAL
Refills: 0 | DISCHARGE

## 2025-01-27 RX ADMIN — Medication 15 MILLIGRAM(S): at 09:25

## 2025-01-27 RX ADMIN — Medication 1000 MILLILITER(S): at 09:25

## 2025-01-27 RX ADMIN — Medication 15 MILLIGRAM(S): at 18:25

## 2025-01-27 RX ADMIN — Medication 2 TABLET(S): at 23:02

## 2025-01-27 RX ADMIN — Medication 5 MILLIGRAM(S): at 09:25

## 2025-01-27 RX ADMIN — Medication 30 MILLIGRAM(S): at 23:03

## 2025-01-27 RX ADMIN — Medication 5 MILLIGRAM(S): at 23:02

## 2025-01-27 NOTE — H&P ADULT - NSICDXFAMILYHX_GEN_ALL_CORE_FT
FAMILY HISTORY:  Father  Still living? Unknown  FH: kidney disease, Age at diagnosis: Age Unknown    Mother  Still living? Unknown  FH: breast cancer, Age at diagnosis: Age Unknown  FH: diabetes mellitus, Age at diagnosis: Age Unknown

## 2025-01-27 NOTE — H&P ADULT - NSHPLABSRESULTS_GEN_ALL_CORE
< from: CT Abdomen and Pelvis w/ IV Cont (01.27.25 @ 11:46) >    Underdistended urinary bladder with questionable mild wall thickening.    < end of copied text >

## 2025-01-27 NOTE — H&P ADULT - NSHPPHYSICALEXAM_GEN_ALL_CORE
T(C): 36.4 (01-27-25 @ 11:35), Max: 36.8 (01-27-25 @ 07:48)  HR: 63 (01-27-25 @ 11:35) (63 - 66)  BP: 103/64 (01-27-25 @ 11:35) (103/64 - 119/69)  RR: 17 (01-27-25 @ 11:35) (17 - 17)  SpO2: 96% (01-27-25 @ 11:35) (96% - 97%)    GENERAL: NAD, well built  HEAD:  Atraumatic, Normocephalic  EYES:  conjunctiva and sclera clear  NECK: Supple, No JVD, Normal thyroid  CHEST/LUNG: Clear to auscultation. No rales, rhonchi, wheezing, or rubs  HEART: Regular rate and rhythm; No murmurs, rubs, or gallops  ABDOMEN: Soft, Nontender, Nondistended; Bowel sounds present  NERVOUS SYSTEM:  Alert & Oriented X3,  no focal deficits  EXTREMITIES:  2+ Peripheral Pulses, No clubbing, cyanosis, or edema  MSK: pain with palpitation of right low back  SKIN: warm dry

## 2025-01-27 NOTE — H&P ADULT - ATTENDING COMMENTS
Ms. Genao is a 54F, ambulates independently, with PMHx of HLD , IBS- constipation here for right low back pain that started 2 days ago. Patient reports that she is having RT lower back pain that gets worse w/ movement if she bends or gets up from a sitting position, denies any radiation to groin, nausea, vomiting, fever or urinary symptoms. She denies heavy lifting, overexertion, trauma. falls, numbness/tingling, new weakness, urinary or fecal incontinence or other complaints.     In ED, her VS were stable.     She was given Toradol and valium but continued to have pain.     CT abd showed- Underdistended urinary bladder with questionable mild wall thickening.    Labs reviewed- cbc, bmp, LFTs     PE as above     A/P:  #Rt Lower back pain- suspect MSK origin  #IBS- constipation  #DVT ppx     Plan:  -Patient c/p Rt lower back pain, appears to be MSK in origin.   -No red flag signs   -CT abd- Underdistended urinary bladder with questionable mild wall thickening.  -Start IV Toradol q 8 hrs standing along flexeril   -Resume home meds   -Will hold off PT for now. Once pain better, have patient ambulate and assess.   -Likely DC home in am

## 2025-01-27 NOTE — ED ADULT NURSE NOTE - NS ED NOTE  TALK SOMEONE YN
Daughter Alissa Mosqueda is calling to give condition report wants to know should ear be black and blue more then this morning and wants to know should this be happening please advise . Hellen Quintana ...  also has questions about another possible pain medication
Informed patient daughter that Rx for pain medication were sent Acetaminophen with codeine were sent and advised pt that due pt taking blood thinner that the normal reaction per Dr Giovany Laird viewed the picture stated it is not a hematoma,it's ecchymosis,patie
No

## 2025-01-27 NOTE — H&P ADULT - HISTORY OF PRESENT ILLNESS
Patient is a 54F, ambulates independently, with PMHx of HLD and constipation here for right low back pain that started 2 days ago. Reports she was cleaning when the back pain started, and is described as sharp, stabbing pain, rated 10/10. Worst with movement and bending, and pt states she can't hold objects without pain occurring. She denies heavy lift Patient is a 54F, ambulates independently, with PMHx of HLD and constipation here for right low back pain that started 2 days ago. Reports she was cleaning when the back pain started, and is described as a sharp, stabbing pain, rated 10/10. Worst with movement and bending, and pt states she can't hold objects without pain occurring. States ice pack mildly helped with the pain. Denies heavy lifting, overexertion, trauma. falls, numbness/tingling, fever, chills, dysuria, hematuria, N/V/D, abdominal pain, chest pain, SOB. Of note, less than 1 month ago pt visited her urologist (Rey Dickerson) for a UTI and completed a 7 day antibiotic course.     In ED: 98.3F, HR: 66, BP: 119/69, 97% on RA  labs unremarkable, UA: neg  CT A/P with cont: Underdistended urinary bladder with questionable mild wall thickening.  s/p 1L NS, diazepam 5mg x1 and ketorolac 15mg IV x1 Patient is a 54F, ambulates independently, with PMHx of HLD and constipation here for right low back pain that started 2 days ago. 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 Worst with movement and bending, and pt states she can't hold objects without pain occurring. States ice pack mildly helped with the pain. Denies heavy lifting, overexertion, trauma. falls, numbness/tingling, fever, chills, dysuria, hematuria, N/V/D, abdominal pain, chest pain, SOB. Of note, less than 1 month ago pt visited her urologist (Rey Dickerson) for a UTI and completed a 7 day antibiotic course.     In ED: 98.3F, HR: 66, BP: 119/69, 97% on RA  labs unremarkable, UA: neg  CT A/P with cont: Underdistended urinary bladder with questionable mild wall thickening.  s/p 1L NS, diazepam 5mg x1 and ketorolac 15mg IV x1 Patient is a 54F, ambulates independently, with PMHx of HLD and IBS-constipation here for right low back pain that started 2 days ago. 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 Worst with movement and bending, and pt states she can't hold objects without pain occurring. States ice pack mildly helped with the pain. Denies heavy lifting, overexertion, trauma. falls, numbness/tingling, fever, chills, dysuria, hematuria, N/V/D, abdominal pain, chest pain, SOB. Of note, less than 1 month ago pt visited her urologist (Rey Dickerson) for a UTI and completed a 7 day antibiotic course.     In ED: 98.3F, HR: 66, BP: 119/69, 97% on RA  labs unremarkable, UA: neg  CT A/P with cont: Underdistended urinary bladder with questionable mild wall thickening.  s/p 1L NS, diazepam 5mg x1 and ketorolac 15mg IV x1

## 2025-01-27 NOTE — ED PROVIDER NOTE - OBJECTIVE STATEMENT
54 yr old female with hx of cholecystectomy presents to ed c/o localized right flank pain x 2 days. no fever, no dysuria, no abd pain, no nv, no rash, no heavy lifting.

## 2025-01-27 NOTE — H&P ADULT - NSHPSOCIALHISTORY_GEN_ALL_CORE
Ambulates independently, lives at home with  and children  Denies alcohol, illicit drug or tobacco use

## 2025-01-27 NOTE — ED PROVIDER NOTE - CLINICAL SUMMARY MEDICAL DECISION MAKING FREE TEXT BOX
54 yr old female with hx of cholecystectomy presents to ed c/o localized right flank pain x 2 days. no fever, no dysuria, no abd pain, no nv, no rash, no heavy lifting.    possibly kidney stone vs msk vs renal injury? vs uti - labs, ct, meds, ua

## 2025-01-27 NOTE — H&P ADULT - PROBLEM SELECTOR PLAN 3
hx of constipation on dicyclomine 10mg 2 tabs BID, senna 2 tabs at bedtime and linzess 290mcg qd  - c/w home meds hx of constipation on dicyclomine 10mg 2 tabs BID, senna 2 tabs at bedtime and linzess 290mcg qd  - c/w home meds dicyclomine and senna

## 2025-01-27 NOTE — H&P ADULT - ASSESSMENT
Patient is a 54F, ambulates independently, with PMHx of HLD and constipation here for right low back pain that started 2 days ago. Admitted for intractable back pain likely MSK
R/T increased demand for nutrients and fluids

## 2025-01-27 NOTE — H&P ADULT - NSHPREVIEWOFSYSTEMS_GEN_ALL_CORE
REVIEW OF SYSTEMS:  CONSTITUTIONAL: Denies weakness, fevers or chills  HEENT: Denies headache, dizziness, visual changes, vertigo, throat pain   RESPIRATORY: Denies cough, wheezing, hemoptysis; denies shortness of breath  CARDIOVASCULAR: denies chest pain or palpitations  GASTROINTESTINAL: denies abdominal  pain, nausea, vomiting, diarrhea, + constipation. Denies melena and hematochezia.  GENITOURINARY: Denies dysuria, frequency or hematuria  NEUROLOGICAL: Denies numbness or weakness  MSK: right low back  pain  SKIN: Denies itching, rashes

## 2025-01-27 NOTE — H&P ADULT - PROBLEM SELECTOR PLAN 1
p/w 2 day of right low back pain, non radiating, worst with movement  likely MSK  labs unremarkable, UA: neg  CT A/P with cont: Underdistended urinary bladder with questionable mild wall thickening.  s/p 1L NS, diazepam 5mg x1 and ketorolac 15mg IV x1  start cyclobenzaprine 5mg TID for 2 days  start Toradol 30mg q8h for 2 days  start lidocaine patch  start tylenol prn  if pt shows no improvement in symptoms, can consider PT

## 2025-01-28 ENCOUNTER — TRANSCRIPTION ENCOUNTER (OUTPATIENT)
Age: 55
End: 2025-01-28

## 2025-01-28 VITALS
HEART RATE: 65 BPM | RESPIRATION RATE: 18 BRPM | SYSTOLIC BLOOD PRESSURE: 100 MMHG | OXYGEN SATURATION: 97 % | DIASTOLIC BLOOD PRESSURE: 61 MMHG | TEMPERATURE: 98 F

## 2025-01-28 DIAGNOSIS — K59.00 CONSTIPATION, UNSPECIFIED: ICD-10-CM

## 2025-01-28 LAB
ANION GAP SERPL CALC-SCNC: 11 MMOL/L — SIGNIFICANT CHANGE UP (ref 5–17)
BUN SERPL-MCNC: 12 MG/DL — SIGNIFICANT CHANGE UP (ref 7–18)
CALCIUM SERPL-MCNC: 8.9 MG/DL — SIGNIFICANT CHANGE UP (ref 8.4–10.5)
CHLORIDE SERPL-SCNC: 111 MMOL/L — HIGH (ref 96–108)
CO2 SERPL-SCNC: 22 MMOL/L — SIGNIFICANT CHANGE UP (ref 22–31)
CREAT SERPL-MCNC: 0.46 MG/DL — LOW (ref 0.5–1.3)
EGFR: 114 ML/MIN/1.73M2 — SIGNIFICANT CHANGE UP
GLUCOSE SERPL-MCNC: 103 MG/DL — HIGH (ref 70–99)
HCT VFR BLD CALC: 40 % — SIGNIFICANT CHANGE UP (ref 34.5–45)
HGB BLD-MCNC: 13.7 G/DL — SIGNIFICANT CHANGE UP (ref 11.5–15.5)
MAGNESIUM SERPL-MCNC: 2.1 MG/DL — SIGNIFICANT CHANGE UP (ref 1.6–2.6)
MCHC RBC-ENTMCNC: 28.5 PG — SIGNIFICANT CHANGE UP (ref 27–34)
MCHC RBC-ENTMCNC: 34.3 G/DL — SIGNIFICANT CHANGE UP (ref 32–36)
MCV RBC AUTO: 83.2 FL — SIGNIFICANT CHANGE UP (ref 80–100)
NRBC # BLD: 0 /100 WBCS — SIGNIFICANT CHANGE UP (ref 0–0)
NRBC BLD-RTO: 0 /100 WBCS — SIGNIFICANT CHANGE UP (ref 0–0)
PHOSPHATE SERPL-MCNC: 4.1 MG/DL — SIGNIFICANT CHANGE UP (ref 2.5–4.5)
PLATELET # BLD AUTO: 269 K/UL — SIGNIFICANT CHANGE UP (ref 150–400)
POTASSIUM SERPL-MCNC: 3.7 MMOL/L — SIGNIFICANT CHANGE UP (ref 3.5–5.3)
POTASSIUM SERPL-SCNC: 3.7 MMOL/L — SIGNIFICANT CHANGE UP (ref 3.5–5.3)
RBC # BLD: 4.81 M/UL — SIGNIFICANT CHANGE UP (ref 3.8–5.2)
RBC # FLD: 14.2 % — SIGNIFICANT CHANGE UP (ref 10.3–14.5)
SODIUM SERPL-SCNC: 144 MMOL/L — SIGNIFICANT CHANGE UP (ref 135–145)
WBC # BLD: 4.99 K/UL — SIGNIFICANT CHANGE UP (ref 3.8–10.5)
WBC # FLD AUTO: 4.99 K/UL — SIGNIFICANT CHANGE UP (ref 3.8–10.5)

## 2025-01-28 PROCEDURE — 96374 THER/PROPH/DIAG INJ IV PUSH: CPT

## 2025-01-28 PROCEDURE — 74177 CT ABD & PELVIS W/CONTRAST: CPT | Mod: MC

## 2025-01-28 PROCEDURE — 99239 HOSP IP/OBS DSCHRG MGMT >30: CPT | Mod: GC

## 2025-01-28 PROCEDURE — 36415 COLL VENOUS BLD VENIPUNCTURE: CPT

## 2025-01-28 PROCEDURE — 85025 COMPLETE CBC W/AUTO DIFF WBC: CPT

## 2025-01-28 PROCEDURE — 81001 URINALYSIS AUTO W/SCOPE: CPT

## 2025-01-28 PROCEDURE — 80048 BASIC METABOLIC PNL TOTAL CA: CPT

## 2025-01-28 PROCEDURE — 83735 ASSAY OF MAGNESIUM: CPT

## 2025-01-28 PROCEDURE — 85027 COMPLETE CBC AUTOMATED: CPT

## 2025-01-28 PROCEDURE — 84100 ASSAY OF PHOSPHORUS: CPT

## 2025-01-28 PROCEDURE — 99222 1ST HOSP IP/OBS MODERATE 55: CPT

## 2025-01-28 PROCEDURE — 80053 COMPREHEN METABOLIC PANEL: CPT

## 2025-01-28 PROCEDURE — 99285 EMERGENCY DEPT VISIT HI MDM: CPT | Mod: 25

## 2025-01-28 RX ORDER — LIDOCAINE HYDROCHLORIDE 30 MG/G
1 CREAM TOPICAL
Qty: 1 | Refills: 0
Start: 2025-01-28

## 2025-01-28 RX ORDER — ACETAMINOPHEN 160 MG/5ML
1000 SUSPENSION ORAL EVERY 6 HOURS
Refills: 0 | Status: DISCONTINUED | OUTPATIENT
Start: 2025-01-28 | End: 2025-01-28

## 2025-01-28 RX ORDER — NAPROXEN 500 MG
1 TABLET ORAL
Qty: 15 | Refills: 0
Start: 2025-01-28 | End: 2025-02-01

## 2025-01-28 RX ORDER — PANTOPRAZOLE 20 MG/1
40 TABLET, DELAYED RELEASE ORAL
Refills: 0 | Status: DISCONTINUED | OUTPATIENT
Start: 2025-01-28 | End: 2025-01-28

## 2025-01-28 RX ORDER — POLYETHYLENE GLYCOL 3350 17 G/17G
17 POWDER, FOR SOLUTION ORAL DAILY
Refills: 0 | Status: DISCONTINUED | OUTPATIENT
Start: 2025-01-28 | End: 2025-01-28

## 2025-01-28 RX ORDER — POLYETHYLENE GLYCOL 3350 17 G/17G
17 POWDER, FOR SOLUTION ORAL
Qty: 510 | Refills: 0
Start: 2025-01-28 | End: 2025-02-26

## 2025-01-28 RX ORDER — PANTOPRAZOLE 20 MG/1
1 TABLET, DELAYED RELEASE ORAL
Qty: 7 | Refills: 0
Start: 2025-01-28 | End: 2025-02-03

## 2025-01-28 RX ORDER — BISACODYL 5 MG
5 TABLET, DELAYED RELEASE (ENTERIC COATED) ORAL EVERY 12 HOURS
Refills: 0 | Status: DISCONTINUED | OUTPATIENT
Start: 2025-01-28 | End: 2025-01-28

## 2025-01-28 RX ORDER — ACETAMINOPHEN 160 MG/5ML
2 SUSPENSION ORAL
Qty: 40 | Refills: 0
Start: 2025-01-28

## 2025-01-28 RX ADMIN — Medication 30 MILLIGRAM(S): at 00:01

## 2025-01-28 RX ADMIN — Medication 30 MILLIGRAM(S): at 06:57

## 2025-01-28 RX ADMIN — Medication 30 MILLIGRAM(S): at 07:55

## 2025-01-28 RX ADMIN — ACETAMINOPHEN 650 MILLIGRAM(S): 160 SUSPENSION ORAL at 12:30

## 2025-01-28 RX ADMIN — POLYETHYLENE GLYCOL 3350 17 GRAM(S): 17 POWDER, FOR SOLUTION ORAL at 13:52

## 2025-01-28 RX ADMIN — Medication 5 MILLIGRAM(S): at 13:53

## 2025-01-28 RX ADMIN — ACETAMINOPHEN 650 MILLIGRAM(S): 160 SUSPENSION ORAL at 11:50

## 2025-01-28 RX ADMIN — ENOXAPARIN SODIUM 40 MILLIGRAM(S): 100 INJECTION SUBCUTANEOUS at 06:57

## 2025-01-28 RX ADMIN — Medication 30 MILLIGRAM(S): at 13:52

## 2025-01-28 RX ADMIN — Medication 5 MILLIGRAM(S): at 06:57

## 2025-01-28 RX ADMIN — Medication 10 MILLIGRAM(S): at 11:47

## 2025-01-28 RX ADMIN — Medication 30 MILLIGRAM(S): at 14:03

## 2025-01-28 RX ADMIN — LIDOCAINE HYDROCHLORIDE 1 PATCH: 30 CREAM TOPICAL at 11:47

## 2025-01-28 NOTE — PATIENT PROFILE ADULT - FUNCTIONAL ASSESSMENT - BASIC MOBILITY 6.
4-calculated by average/Not able to assess (calculate score using [facility] averaging method)

## 2025-01-28 NOTE — DISCHARGE NOTE PROVIDER - NSDCMRMEDTOKEN_GEN_ALL_CORE_FT
acetaminophen 500 mg oral tablet: 2 tab(s) orally for 3 days and then take as needed for moderate pain  cyclobenzaprine 5 mg oral tablet: 1 tab(s) orally 3 times a day  dicyclomine 10 mg oral capsule: 2 cap(s) orally 2 times a day  lidocaine 4% topical film: Apply topically to affected area once a day  Linzess 290 mcg oral capsule: 1 cap(s) orally once a day  naproxen 375 mg oral tablet: 1 tab(s) orally 3 times a day  pantoprazole 40 mg oral delayed release tablet: 1 tab(s) orally once a day  polyethylene glycol 3350 oral powder for reconstitution: 17 gram(s) orally once a day  senna: 2 tab(s) once a day (at bedtime)

## 2025-01-28 NOTE — CONSULT NOTE ADULT - ASSESSMENT
Confidential Drug Utilization Report  Search Terms: Marii Genao, 1970Search Date: 01/28/2025 12:08:31 PM  The Drug Utilization Report below displays all of the controlled substance prescriptions, if any, that your patient has filled in the last twelve months. The information displayed on this report is compiled from pharmacy submissions to the Department, and accurately reflects the information as submitted by the pharmacies.    This report was requested by: Tosha Martínez | Reference #: [id number]    There are no results for the search terms that you entered.

## 2025-01-28 NOTE — DISCHARGE NOTE NURSING/CASE MANAGEMENT/SOCIAL WORK - FINANCIAL ASSISTANCE
[facility] provides services at a reduced cost to those who are determined to be eligible through [facility]’s financial assistance program. Information regarding [facility]’s financial assistance program can be found by going to [email or web address] or by calling [phone].

## 2025-01-28 NOTE — DISCHARGE NOTE NURSING/CASE MANAGEMENT/SOCIAL WORK - PATIENT PORTAL LINK FT
You can access the FollowMyHealth Patient Portal offered by [facility] by registering at the following website: [email or web address]. By joining "[employer]’s FollowMyHealth portal, you will also be able to view your health information using other applications (apps) compatible with our system.

## 2025-01-28 NOTE — PATIENT PROFILE ADULT - FALL HARM RISK - HARM RISK INTERVENTIONS

## 2025-01-28 NOTE — CONSULT NOTE ADULT - PROBLEM SELECTOR RECOMMENDATION 9
Pt with acute right lumbar back pain which is somatic in nature likely due to muscle strain. Pt with hx IBS/ constipation. On Linzess at home.   Maximize non-opioid pain recommendations   - Continue Toradol 30mg IVP q 8 hours x 2 days. Upon discharge can transition to naproxen 375mg PO BID x 5 days with PPI.   - Acetaminophen 1 gram PO q 6 hours x 3 days, then PRN moderate pain upon discharge. Monitor LFTs  - Continue Lidoderm 4% patch daily.   Bowel Regimen  - Miralax 17G PO daily  - Continue Senna 2 tablets at bedtime for constipation  - Dulcolax 5mg PO BID PRN constipation  Mild pain (score 1-3)  - Non-pharmacological pain treatment recommendations  - Warm/ Cool packs PRN   - Repositioning, imagery, relaxation, distraction.  - Physical therapy OOB if no contraindications   Recommendations discussed with primary team and RN. May follow up with Telepain Dr. Conner at [phone]. Pt with acute right lumbar back pain which is somatic in nature likely due to muscle strain. Pt with hx IBS/ constipation. On Linzess at home.   Maximize non-opioid pain recommendations   - Continue Toradol 30mg IVP q 8 hours x 2 days. Upon discharge can transition to naproxen 375mg PO BID x 5 days with PPI.   - Acetaminophen 1 gram PO q 6 hours x 3 days, then PRN moderate pain upon discharge. Monitor LFTs  - Continue Lidoderm 4% patch daily.   - Continue flexeril 5mg PO TID, may discharge for 5 days.   Bowel Regimen  - Miralax 17G PO daily  - Continue Senna 2 tablets at bedtime for constipation  - Dulcolax 5mg PO BID PRN constipation  Mild pain (score 1-3)  - Non-pharmacological pain treatment recommendations  - Warm/ Cool packs PRN   - Repositioning, imagery, relaxation, distraction.  - Physical therapy OOB if no contraindications   Recommendations discussed with primary team and RN. May follow up with Telepain Dr. Conner at [phone].

## 2025-01-28 NOTE — DISCHARGE NOTE NURSING/CASE MANAGEMENT/SOCIAL WORK - NSDCPEFALRISK_GEN_ALL_CORE
For information on Fall & Injury Prevention, visit: [email or web address] OR  [email or web address] OR  https://www.cdc.gov/steadi/patient.html

## 2025-01-28 NOTE — DISCHARGE NOTE PROVIDER - CARE PROVIDER_API CALL
Deborah Hale  Family Medicine  [address] [place] [id number]  Phone: [phone]  Fax: [phone]  Established Patient  Follow Up Time: 1 week

## 2025-01-28 NOTE — DISCHARGE NOTE PROVIDER - HOSPITAL COURSE
54 F, ambulates independently, with PMHx of HLD and IBS-constipation p/w low back pain x 2 days. Describes it as a sharp, stabbing pain, rated 10/10, non-radiating. Pain aggravated w/ movement. Patient is able to bear weight on her legs and is able to walk. Denies heavy lifting, overexertion, trauma, falls, numbness/tingling, fever, chills, dysuria, hematuria, N/V/D, abdominal pain, chest pain, SOB. In ED, afebrile, HR 66, /69, 97% on RA. Labs unremarkable, UA neg. CT AP revealed underdistended urinary bladder with questionable mild wall thickening. s/p 1L NS, diazepam 5mg x1 and ketorolac 15mg IV x1 in ED. Patient was admitted to medicine for intractable back pain. Patient was started on lidocaine patch, tylenol and IV toradol. Pain management was consulted and patient was recommended to continue Toradol 30mg IVP q 8 hours x 2 days. Patient was discharged on naproxen 375mg PO BID x 5 days with PPI, acetaminophen 1 gram PO q 6 hours x 3 days, then PRN moderate pain, Lidoderm 4% patch daily, flexeril 5mg PO TID x 5 days.    Given patient's improved clinical status and current hemodynamic stability, decision was made to discharge the patient. Patient is stable for discharge per attending and is advised to follow up with PCP as outpatient.

## 2025-01-28 NOTE — DISCHARGE NOTE PROVIDER - NSDCCPCAREPLAN_GEN_ALL_CORE_FT
PRINCIPAL DISCHARGE DIAGNOSIS  Diagnosis: Low back strain  Assessment and Plan of Treatment:       SECONDARY DISCHARGE DIAGNOSES  Diagnosis: HLD (hyperlipidemia)  Assessment and Plan of Treatment: You have history of Hyperlipidemia. Please take your medication as prescribed. Maintain healthy lifestyle, low fat diet, exercise regularly and check your lipid levels routinely. Please follow up with your PCP in 1 week from discharge.    Diagnosis: IBS (irritable bowel syndrome)  Assessment and Plan of Treatment: You have history of irritable bowel syndrome. Please continue to take your home medications as prescibed by your doctor. Please follow up with your PCP/GI doctor for further management.     PRINCIPAL DISCHARGE DIAGNOSIS  Diagnosis: Low back strain  Assessment and Plan of Treatment: You have been diagnosed with a low back strain, which is likely musculoskeletal in nature. This means that your back pain is probably due to a strain in the muscles or ligaments in your lower back. With proper care and attention, you should be able to recover well. Rest your back by avoiding activities that cause pain or discomfort. However, try to stay as active as possible with gentle movements to prevent stiffness. You are being discharged with pain medications. Take NAPROXEN 375 mg TWICE A DAY FOR 5 DAYS WITH PANTOPRAZOLE 40 MG DAILY. Take ACETAMINOPHEN (TYLENOL) 1000 MG EVERY 6 HOURS FOR 3 DAYS, AND THEN TAKE IT EVERY 6 HOURS AS NEEDED FOR MODERATE PAIN. Apply LIDOCAINE PATCH DAILY and take FLEXIRIL 5 MG THREE TIMES A DAY FOR 5 DAYS. Pleae follow up with your PCP within a week from discharge from the hospital.      SECONDARY DISCHARGE DIAGNOSES  Diagnosis: HLD (hyperlipidemia)  Assessment and Plan of Treatment: You have history of Hyperlipidemia. Please take your medication as prescribed. Maintain healthy lifestyle, low fat diet, exercise regularly and check your lipid levels routinely. Please follow up with your PCP in 1 week from discharge.    Diagnosis: IBS (irritable bowel syndrome)  Assessment and Plan of Treatment: You have history of irritable bowel syndrome. Please continue to take your home medications as prescibed by your doctor. Please follow up with your PCP/GI doctor for further management.     PRINCIPAL DISCHARGE DIAGNOSIS  Diagnosis: Low back strain  Assessment and Plan of Treatment: You have been diagnosed with a low back strain, which is likely musculoskeletal in nature. This means that your back pain is probably due to a strain in the muscles or ligaments in your lower back. With proper care and attention, you should be able to recover well. Rest your back by avoiding activities that cause pain or discomfort. However, try to stay as active as possible with gentle movements to prevent stiffness. You are being discharged with pain medications. Take NAPROXEN 375 mg TWICE A DAY FOR 5 DAYS WITH PANTOPRAZOLE 40 MG DAILY. Take ACETAMINOPHEN (TYLENOL) 1000 MG EVERY 6 HOURS FOR 3 DAYS, AND THEN TAKE IT EVERY 6 HOURS AS NEEDED FOR MODERATE PAIN. Apply LIDOCAINE PATCH DAILY and take FLEXIRIL 5 MG THREE TIMES A DAY FOR 5 DAYS. Pleae follow up with your PCP within a week from discharge from the hospital.      SECONDARY DISCHARGE DIAGNOSES  Diagnosis: Intractable low back pain  Assessment and Plan of Treatment: You have been diagnosed with a low back strain, which is likely musculoskeletal in nature. This means that your back pain is probably due to a strain in the muscles or ligaments in your lower back. With proper care and attention, you should be able to recover well. Rest your back by avoiding activities that cause pain or discomfort. However, try to stay as active as possible with gentle movements to prevent stiffness. You are being discharged with pain medications. Take NAPROXEN 375 mg TWICE A DAY FOR 5 DAYS WITH PANTOPRAZOLE 40 MG DAILY. Take ACETAMINOPHEN (TYLENOL) 1000 MG EVERY 6 HOURS FOR 3 DAYS, AND THEN TAKE IT EVERY 6 HOURS AS NEEDED FOR MODERATE PAIN. Apply LIDOCAINE PATCH DAILY and take FLEXIRIL 5 MG THREE TIMES A DAY FOR 5 DAYS. Pleae follow up with your PCP within a week from discharge from the hospital.    Diagnosis: HLD (hyperlipidemia)  Assessment and Plan of Treatment: You have history of Hyperlipidemia. Please take your medication as prescribed. Maintain healthy lifestyle, low fat diet, exercise regularly and check your lipid levels routinely. Please follow up with your PCP in 1 week from discharge.    Diagnosis: IBS (irritable bowel syndrome)  Assessment and Plan of Treatment: You have history of irritable bowel syndrome. Please continue to take your home medications as prescibed by your doctor. Please follow up with your PCP/GI doctor for further management.    Diagnosis: Spasm of muscle of lower back  Assessment and Plan of Treatment: You have been diagnosed with a low back strain, which is likely musculoskeletal in nature. This means that your back pain is probably due to a strain in the muscles or ligaments in your lower back. With proper care and attention, you should be able to recover well. Rest your back by avoiding activities that cause pain or discomfort. However, try to stay as active as possible with gentle movements to prevent stiffness. You are being discharged with pain medications. Take NAPROXEN 375 mg TWICE A DAY FOR 5 DAYS WITH PANTOPRAZOLE 40 MG DAILY. Take ACETAMINOPHEN (TYLENOL) 1000 MG EVERY 6 HOURS FOR 3 DAYS, AND THEN TAKE IT EVERY 6 HOURS AS NEEDED FOR MODERATE PAIN. Apply LIDOCAINE PATCH DAILY and take FLEXIRIL 5 MG THREE TIMES A DAY FOR 5 DAYS. Pleae follow up with your PCP within a week from discharge from the hospital.

## 2025-01-28 NOTE — CONSULT NOTE ADULT - SUBJECTIVE AND OBJECTIVE BOX
Source of information: [facility], Chart review  Patient language: English  : n/a    HPI:  Patient is a 54F, ambulates independently, with PMHx of HLD and IBS-constipation here for right low back pain that started 2 days ago. 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 Worst with movement and bending, and pt states she can't hold objects without pain occurring. States ice pack mildly helped with the pain. Denies heavy lifting, overexertion, trauma. falls, numbness/tingling, fever, chills, dysuria, hematuria, N/V/D, abdominal pain, chest pain, SOB. Of note, less than 1 month ago pt visited her urologist (Rey Dickerson) for a UTI and completed a 7 day antibiotic course.     In ED: 98.3F, HR: 66, BP: 119/69, 97% on RA  labs unremarkable, UA: neg  CT A/P with cont: Underdistended urinary bladder with questionable mild wall thickening.  s/p 1L NS, diazepam 5mg x1 and ketorolac 15mg IV x1 (27 Jan 2025 14:15)      Patient is a 54y old  Female who presents  intractable back pain. Pain consulted 1/28. Pt seen and examined at bedside. Reports right lumbar back pain score 4/10, improving since admission SCALE USED: (1-10 VNRS). Pt describes pain as aching, occasionally sharp, localized non-radiating, alleviated by pain medication, exacerbated by movement. Denies abdominal pain. Pt tolerating PO diet. Denies lethargy, chest pain, SOB, nausea, vomiting. Reports hx IBS constipation, last BM 1/27. Reports she takes Linzess at home. Patient stated goal for pain control: to be able to take deep breaths, get out of bed to chair and ambulate with tolerable pain control. Plan to be discharged home today.     PAST MEDICAL & SURGICAL HISTORY:  HLD (hyperlipidemia)      IBS (irritable bowel syndrome)      History of cholecystectomy      H/O liposuction of abdomen          FAMILY HISTORY:  FH: breast cancer (Mother)    FH: diabetes mellitus (Mother)    FH: kidney disease (Father)        Social History:  Ambulates independently, lives at home with  and children  Denies alcohol, illicit drug or tobacco use (27 Jan 2025 14:15)    Allergies    No Known Drug Allergies  peanuts (Pruritus)  peanuts (Anaphylaxis)  anesthesia cause distonic reaction (Other)  Peaches (Other)    MEDICATIONS  (STANDING):  cyclobenzaprine 5 milliGRAM(s) Oral three times a day  dicyclomine 10 milliGRAM(s) Oral daily  enoxaparin Injectable 40 milliGRAM(s) SubCutaneous every 24 hours  ketorolac   Injectable 30 milliGRAM(s) IV Push every 8 hours  lidocaine   4% Patch 1 Patch Transdermal daily  senna 2 Tablet(s) Oral at bedtime    MEDICATIONS  (PRN):  acetaminophen     Tablet .. 650 milliGRAM(s) Oral every 6 hours PRN Temp greater or equal to 38C (100.4F), Mild Pain (1 - 3)      Vital Signs Last 24 Hrs  T(C): 36.7 (28 Jan 2025 12:18), Max: 36.8 (27 Jan 2025 21:32)  T(F): 98.1 (28 Jan 2025 12:18), Max: 98.2 (27 Jan 2025 21:32)  HR: 65 (28 Jan 2025 12:18) (59 - 66)  BP: 100/61 (28 Jan 2025 12:18) (100/61 - 116/56)  BP(mean): --  RR: 18 (28 Jan 2025 12:18) (18 - 18)  SpO2: 97% (28 Jan 2025 12:18) (95% - 98%)    Parameters below as of 28 Jan 2025 12:18  Patient On (Oxygen Delivery Method): room air        LABS: Reviewed.                          13.7   4.99  )-----------( 269      ( 28 Jan 2025 06:55 )             40.0     01-28    144  |  111[H]  |  12  ----------------------------<  103[H]  3.7   |  22  |  0.46[L]    Ca    8.9      28 Jan 2025 06:55  Phos  4.1     01-28  Mg     2.1     01-28    TPro  6.9  /  Alb  3.5  /  TBili  0.3  /  DBili  x   /  AST  16  /  ALT  27  /  AlkPhos  94  01-27      LIVER FUNCTIONS - ( 27 Jan 2025 09:37 )  Alb: 3.5 g/dL / Pro: 6.9 g/dL / ALK PHOS: 94 U/L / ALT: 27 U/L DA / AST: 16 U/L / GGT: x           Urinalysis Basic - ( 28 Jan 2025 06:55 )    Color: x / Appearance: x / SG: x / pH: x  Gluc: 103 mg/dL / Ketone: x  / Bili: x / Urobili: x   Blood: x / Protein: x / Nitrite: x   Leuk Esterase: x / RBC: x / WBC x   Sq Epi: x / Non Sq Epi: x / Bacteria: x    Radiology: Reviewed.   < from: CT Abdomen and Pelvis w/ IV Cont (01.27.25 @ 11:46) >    ACC: [id number] EXAM:  CT ABDOMEN AND PELVIS IC   ORDERED BY: CONCHIS MAN     PROCEDURE DATE:  01/27/2025          INTERPRETATION:  CLINICAL INFORMATION: Right flank pain    COMPARISON: None.    CONTRAST/COMPLICATIONS:  IV Contrast: Omnipaque 350  90 cc administered   10 cc discarded  Oral Contrast: NONE  .    PROCEDURE:  CT of the Abdomen and Pelvis was performed.  Sagittal and coronal reformats were performed.    FINDINGS:  LOWER CHEST: Within normal limits.    LIVER: Within normal limits.  BILE DUCTS: Prominence of the common bile duct, compatible with.  GALLBLADDER: Cholecystectomy.  SPLEEN: Within normal limits.  PANCREAS: Within normal limits.  ADRENALS: Within normal limits.  KIDNEYS/URETERS: Subcentimeter right renal hypodensity, too small to   characterize. No hydronephrosis.    BLADDER: Underdistended with questionable mild wall thickening.  REPRODUCTIVE ORGANS: Uterus and adnexa within normal limits.    BOWEL: No bowel obstruction. Appendix is normal.  PERITONEUM/RETROPERITONEUM: Within normal limits.  VESSELS: Atherosclerotic changes.  LYMPH NODES: No lymphadenopathy.  ABDOMINAL WALL: Postsurgical changes.  BONES: Within normal limits.    IMPRESSION:  Underdistended urinary bladder with questionable mild wall thickening.        --- End of Report ---            LINN ANN MD; Attending Radiologist  This document has been electronically signed. Jan 27 2025 12:27PM    < end of copied text >      ORT Score -   Family Hx of substance abuse	Female	      Male  Alcohol 	                                           1                     3  Illegal drugs	                                   2                     3  Rx drugs                                           4 	                  4  Personal Hx of substance abuse		  Alcohol 	                                          3	                  3  Illegal drugs                                     4	                  4  Rx drugs                                            5 	                  5  Age between 16- 45 years	           1                     1  hx preadolescent sexual abuse	   3 	                  0  Psychological disease		  ADD, OCD, bipolar, schizophrenia   2	          2  Depression                                           1 	          1  Total: 0    a score of 3 or lower indicates low risk for opioid abuse		  a score of 4-7 indicates moderate risk for opioid abuse		  a score of 8 or higher indicates high risk for opioid abuse    REVIEW OF SYSTEMS:  CONSTITUTIONAL: No fever or fatigue  HEENT:  No difficulty hearing, no change in vision  NECK: No pain or stiffness  RESPIRATORY: No cough, wheezing, chills or hemoptysis; No shortness of breath  CARDIOVASCULAR: No chest pain, palpitations, dizziness, or leg swelling  GASTROINTESTINAL: No loss of appetite, decreased PO intake. No abdominal or epigastric pain. No nausea, vomiting; No diarrhea + hx IBS/ constipation.   GENITOURINARY: No dysuria, frequency, hematuria, retention or incontinence  MUSCULOSKELETAL: + right lumbar back pain. + occasional hand and ankle joint swelling; + occasional muscle cramps; no upper or lower motor strength weakness, no saddle anesthesia, bowel/bladder incontinence, no falls   NEURO: No headaches, No numbness/tingling b/l LE, No weakness    PHYSICAL EXAM:  GENERAL:  Alert & Oriented X4, cooperative, NAD, Good concentration. Speech is clear.   RESPIRATORY: Respirations even and unlabored. Clear to auscultation bilaterally; No rales, rhonchi, wheezing, or rubs  CARDIOVASCULAR: Normal S1/S2, regular rate and rhythm; No murmurs, rubs, or gallops. No JVD.   GASTROINTESTINAL:  Soft, Nontender, Nondistended; Bowel sounds present  PERIPHERAL VASCULAR:  Extremities warm without edema. 2+ Peripheral Pulses, No cyanosis, No calf tenderness  MUSCULOSKELETAL: Motor Strength 5/5 B/L upper and lower extremities; moves all extremities equally against gravity; + decreased back ROM; negative SLR; + right lumbar tenderness on palpation.   SKIN: Warm, dry, intact. No rashes, lesions, scars or wounds. + right lumbar back lidoderm patch in place     Risk factors associated with adverse outcomes related to opioid treatment  [ ]  Concurrent benzodiazepine use  [ ]  History/ Active substance use or alcohol use disorder  [ ] Psychiatric co-morbidity  [ ] Sleep apnea  [ ] COPD  [ ] BMI> 35  [ ] Liver dysfunction  [ ] Renal dysfunction  [ ] CHF  [ ] Smoker  [ ]  Age > 60 years    [X ]  [place]  Reviewed and Copied to Chart. See below.    Plan of care and goal oriented pain management treatment options were discussed with patient and /or primary care giver; all questions and concerns were addressed and care was aligned with patient's wishes.    Educated patient on goal oriented pain management treatment options

## 2025-01-28 NOTE — DISCHARGE NOTE PROVIDER - ATTENDING DISCHARGE PHYSICAL EXAMINATION:
GENERAL: NAD, Able to ambulate independently  HEAD:  Atraumatic, Normocephalic  EYES: conjunctiva and sclera clear  NECK: Supple, No JVD  CHEST/LUNG: Clear to auscultation bilaterally; No wheeze  HEART: Regular rate and rhythm; No murmurs, rubs, or gallops  ABDOMEN: Soft, Nontender, Nondistended; Bowel sounds present  EXTREMITIES:  No clubbing, cyanosis, or edema  PSYCH: AAOx3  NEUROLOGY: non-focal  SKIN: No rashes or lesions